# Patient Record
Sex: FEMALE | Race: WHITE | NOT HISPANIC OR LATINO | Employment: FULL TIME | URBAN - METROPOLITAN AREA
[De-identification: names, ages, dates, MRNs, and addresses within clinical notes are randomized per-mention and may not be internally consistent; named-entity substitution may affect disease eponyms.]

---

## 2017-02-02 ENCOUNTER — ALLSCRIPTS OFFICE VISIT (OUTPATIENT)
Dept: OTHER | Facility: OTHER | Age: 37
End: 2017-02-02

## 2017-02-02 ENCOUNTER — GENERIC CONVERSION - ENCOUNTER (OUTPATIENT)
Dept: OTHER | Facility: OTHER | Age: 37
End: 2017-02-02

## 2017-02-02 DIAGNOSIS — O26.849 UTERINE SIZE-DATE DISCREPANCY: ICD-10-CM

## 2017-02-02 DIAGNOSIS — Z34.90 ENCOUNTER FOR SUPERVISION OF NORMAL PREGNANCY: ICD-10-CM

## 2017-02-21 ENCOUNTER — GENERIC CONVERSION - ENCOUNTER (OUTPATIENT)
Dept: OTHER | Facility: OTHER | Age: 37
End: 2017-02-21

## 2017-03-02 ENCOUNTER — APPOINTMENT (OUTPATIENT)
Dept: PERINATAL CARE | Facility: CLINIC | Age: 37
End: 2017-03-02
Payer: COMMERCIAL

## 2017-03-02 ENCOUNTER — GENERIC CONVERSION - ENCOUNTER (OUTPATIENT)
Dept: OTHER | Facility: OTHER | Age: 37
End: 2017-03-02

## 2017-03-02 PROCEDURE — 97802 MEDICAL NUTRITION INDIV IN: CPT | Performed by: OBSTETRICS & GYNECOLOGY

## 2017-03-23 ENCOUNTER — GENERIC CONVERSION - ENCOUNTER (OUTPATIENT)
Dept: OTHER | Facility: OTHER | Age: 37
End: 2017-03-23

## 2017-04-04 ENCOUNTER — GENERIC CONVERSION - ENCOUNTER (OUTPATIENT)
Dept: OTHER | Facility: OTHER | Age: 37
End: 2017-04-04

## 2017-04-04 ENCOUNTER — ALLSCRIPTS OFFICE VISIT (OUTPATIENT)
Dept: PERINATAL CARE | Facility: CLINIC | Age: 37
End: 2017-04-04
Payer: COMMERCIAL

## 2017-04-04 ENCOUNTER — APPOINTMENT (OUTPATIENT)
Dept: PERINATAL CARE | Facility: CLINIC | Age: 37
End: 2017-04-04
Payer: COMMERCIAL

## 2017-04-04 PROCEDURE — 76817 TRANSVAGINAL US OBSTETRIC: CPT | Performed by: OBSTETRICS & GYNECOLOGY

## 2017-04-04 PROCEDURE — 97802 MEDICAL NUTRITION INDIV IN: CPT | Performed by: OBSTETRICS & GYNECOLOGY

## 2017-04-04 PROCEDURE — 76811 OB US DETAILED SNGL FETUS: CPT | Performed by: OBSTETRICS & GYNECOLOGY

## 2017-04-06 ENCOUNTER — APPOINTMENT (OUTPATIENT)
Dept: PERINATAL CARE | Facility: CLINIC | Age: 37
End: 2017-04-06
Payer: COMMERCIAL

## 2017-04-06 ENCOUNTER — GENERIC CONVERSION - ENCOUNTER (OUTPATIENT)
Dept: OTHER | Facility: OTHER | Age: 37
End: 2017-04-06

## 2017-04-06 PROCEDURE — G0108 DIAB MANAGE TRN  PER INDIV: HCPCS | Performed by: OBSTETRICS & GYNECOLOGY

## 2017-04-19 ENCOUNTER — GENERIC CONVERSION - ENCOUNTER (OUTPATIENT)
Dept: OTHER | Facility: OTHER | Age: 37
End: 2017-04-19

## 2017-05-16 ENCOUNTER — GENERIC CONVERSION - ENCOUNTER (OUTPATIENT)
Dept: OTHER | Facility: OTHER | Age: 37
End: 2017-05-16

## 2017-05-16 DIAGNOSIS — Z98.84 BARIATRIC SURGERY STATUS: ICD-10-CM

## 2017-05-16 DIAGNOSIS — Z34.90 ENCOUNTER FOR SUPERVISION OF NORMAL PREGNANCY: ICD-10-CM

## 2017-05-16 DIAGNOSIS — O34.219 MATERNAL CARE FOR SCAR FROM PREVIOUS CESAREAN DELIVERY: ICD-10-CM

## 2017-05-16 DIAGNOSIS — O26.899 OTHER SPECIFIED PREGNANCY RELATED CONDITIONS, UNSPECIFIED TRIMESTER: ICD-10-CM

## 2017-05-25 ENCOUNTER — APPOINTMENT (OUTPATIENT)
Dept: PHYSICAL THERAPY | Facility: REHABILITATION | Age: 37
End: 2017-05-25
Payer: COMMERCIAL

## 2017-05-25 PROCEDURE — 97110 THERAPEUTIC EXERCISES: CPT

## 2017-05-25 PROCEDURE — 97161 PT EVAL LOW COMPLEX 20 MIN: CPT

## 2017-05-26 ENCOUNTER — GENERIC CONVERSION - ENCOUNTER (OUTPATIENT)
Dept: OTHER | Facility: OTHER | Age: 37
End: 2017-05-26

## 2017-05-30 ENCOUNTER — APPOINTMENT (OUTPATIENT)
Dept: PERINATAL CARE | Facility: CLINIC | Age: 37
End: 2017-05-30
Payer: COMMERCIAL

## 2017-05-30 ENCOUNTER — GENERIC CONVERSION - ENCOUNTER (OUTPATIENT)
Dept: OTHER | Facility: OTHER | Age: 37
End: 2017-05-30

## 2017-05-30 ENCOUNTER — ALLSCRIPTS OFFICE VISIT (OUTPATIENT)
Dept: PERINATAL CARE | Facility: CLINIC | Age: 37
End: 2017-05-30
Payer: COMMERCIAL

## 2017-05-30 PROCEDURE — G0108 DIAB MANAGE TRN  PER INDIV: HCPCS | Performed by: OBSTETRICS & GYNECOLOGY

## 2017-05-30 PROCEDURE — 76816 OB US FOLLOW-UP PER FETUS: CPT | Performed by: OBSTETRICS & GYNECOLOGY

## 2017-05-31 ENCOUNTER — APPOINTMENT (OUTPATIENT)
Dept: PHYSICAL THERAPY | Facility: REHABILITATION | Age: 37
End: 2017-05-31
Payer: COMMERCIAL

## 2017-05-31 ENCOUNTER — ALLSCRIPTS OFFICE VISIT (OUTPATIENT)
Dept: OTHER | Facility: OTHER | Age: 37
End: 2017-05-31

## 2017-05-31 PROCEDURE — 97140 MANUAL THERAPY 1/> REGIONS: CPT

## 2017-05-31 PROCEDURE — 97110 THERAPEUTIC EXERCISES: CPT

## 2017-06-05 ENCOUNTER — APPOINTMENT (OUTPATIENT)
Dept: PHYSICAL THERAPY | Facility: REHABILITATION | Age: 37
End: 2017-06-05
Payer: COMMERCIAL

## 2017-06-05 PROCEDURE — 97140 MANUAL THERAPY 1/> REGIONS: CPT

## 2017-06-05 PROCEDURE — 97110 THERAPEUTIC EXERCISES: CPT

## 2017-06-14 ENCOUNTER — GENERIC CONVERSION - ENCOUNTER (OUTPATIENT)
Dept: OTHER | Facility: OTHER | Age: 37
End: 2017-06-14

## 2017-06-15 ENCOUNTER — APPOINTMENT (OUTPATIENT)
Dept: PHYSICAL THERAPY | Facility: REHABILITATION | Age: 37
End: 2017-06-15
Payer: COMMERCIAL

## 2017-06-15 PROCEDURE — 97140 MANUAL THERAPY 1/> REGIONS: CPT

## 2017-06-15 PROCEDURE — 97110 THERAPEUTIC EXERCISES: CPT

## 2017-06-20 ENCOUNTER — APPOINTMENT (OUTPATIENT)
Dept: PHYSICAL THERAPY | Facility: REHABILITATION | Age: 37
End: 2017-06-20
Payer: COMMERCIAL

## 2017-06-20 PROCEDURE — 97140 MANUAL THERAPY 1/> REGIONS: CPT

## 2017-06-20 PROCEDURE — 97110 THERAPEUTIC EXERCISES: CPT

## 2017-06-27 ENCOUNTER — APPOINTMENT (OUTPATIENT)
Dept: PHYSICAL THERAPY | Facility: REHABILITATION | Age: 37
End: 2017-06-27
Payer: COMMERCIAL

## 2017-06-27 ENCOUNTER — GENERIC CONVERSION - ENCOUNTER (OUTPATIENT)
Dept: OTHER | Facility: OTHER | Age: 37
End: 2017-06-27

## 2017-06-27 PROCEDURE — 97140 MANUAL THERAPY 1/> REGIONS: CPT

## 2017-06-27 PROCEDURE — 97110 THERAPEUTIC EXERCISES: CPT

## 2017-07-11 ENCOUNTER — APPOINTMENT (OUTPATIENT)
Dept: PHYSICAL THERAPY | Facility: REHABILITATION | Age: 37
End: 2017-07-11
Payer: COMMERCIAL

## 2017-07-11 PROCEDURE — 97140 MANUAL THERAPY 1/> REGIONS: CPT

## 2017-07-11 PROCEDURE — 97110 THERAPEUTIC EXERCISES: CPT

## 2017-07-25 ENCOUNTER — APPOINTMENT (OUTPATIENT)
Dept: PHYSICAL THERAPY | Facility: REHABILITATION | Age: 37
End: 2017-07-25
Payer: COMMERCIAL

## 2017-07-27 ENCOUNTER — APPOINTMENT (OUTPATIENT)
Dept: PHYSICAL THERAPY | Facility: REHABILITATION | Age: 37
End: 2017-07-27
Payer: COMMERCIAL

## 2017-07-27 PROCEDURE — 97110 THERAPEUTIC EXERCISES: CPT

## 2017-07-27 PROCEDURE — 97140 MANUAL THERAPY 1/> REGIONS: CPT

## 2018-01-12 VITALS
WEIGHT: 239.2 LBS | HEIGHT: 64 IN | SYSTOLIC BLOOD PRESSURE: 104 MMHG | DIASTOLIC BLOOD PRESSURE: 55 MMHG | BODY MASS INDEX: 40.84 KG/M2

## 2018-01-12 VITALS
HEIGHT: 64 IN | BODY MASS INDEX: 40.87 KG/M2 | WEIGHT: 239.4 LBS | DIASTOLIC BLOOD PRESSURE: 71 MMHG | SYSTOLIC BLOOD PRESSURE: 122 MMHG

## 2018-01-13 VITALS
DIASTOLIC BLOOD PRESSURE: 71 MMHG | SYSTOLIC BLOOD PRESSURE: 134 MMHG | WEIGHT: 248.04 LBS | HEIGHT: 65 IN | BODY MASS INDEX: 41.33 KG/M2

## 2018-01-13 NOTE — PROGRESS NOTES
MAY 30 2017         RE: Jaymie Harris                                 To: [id number] [facility] [address]   [facility]   MR#: [id number]                                  [facility]   : [address]   ENC: [id number]:VGVILMA Perea Lei, [facility]   (Exam #: [id number])                           Fax: [phone]      The LMP of this 39year old,  G4, P2-0-1-2 patient was 2016, giving   her an BERTHA of AUG 19 2017 and a current gestational age of 35 weeks 3 days   by dates  A sonographic examination was performed on MAY 30 2017 using   real time equipment  The ultrasound examination was performed using   abdominal technique  The patient has a BMI of 41 4  Her blood pressure   today was 134/71  Earliest ultrasound found in her record: 1/10/17 8w3d  17 BERTHA      Cardiac motion was observed at 148 bpm       INDICATIONS      advanced maternal age   morbid obesity   Previous C/S x 2   maternal gastric bypass   fetal growth   missed anatomy follow up   diabetes, gestational, A1      Exam Types      Level I      RESULTS      Fetus # 1 of 1   Breech presentation   Fetal growth appeared normal   Placenta Location = Anterior   No placenta previa   Placenta Grade = II      MEASUREMENTS (* Included In Average GA)      AC              25 3 cm        29 weeks 4 days* (64%)   BPD              7 1 cm        28 weeks 4 days* (45%)   HC              27 0 cm        29 weeks 1 day * (51%)   Femur            5 3 cm        28 weeks 2 days* (37%)      Humerus          4 9 cm        28 weeks 6 days  (52%)      Cerebellum       3 4 cm        30 weeks 0 days      HC/AC           1 07   FL/AC           0 21   FL/BPD          0 75   EFW (Ac/Fl/Hc)  1330 grams - 2 lbs 15 oz                 (57%)      THE AVERAGE GESTATIONAL AGE is 28 weeks 6 days +/- 18 days        AMNIOTIC FLUID      Q1: 5 6      Q2: 6 3      Q3: 3 6      Q4: 4 9   BALTA Total = 20 4 cm   Amniotic Fluid: Normal      ANATOMY DETAILS      Visualized Appearing Sonographically Normal:   HEAD: (Calvarium, BPD Level, Cavum, Lateral Ventricles, Cerebellum);      FACE/NECK: (Nose/Lips, Palate, Face); HEART: (Four Chamber View,   Proximal Left Outflow, Proximal Right Outflow, Ductal Arch, Aortic Arch,   Interventricular Septum, IVC, SVC, Cardiac Axis, Cardiac Position);      STOMACH, RIGHT KIDNEY, LEFT KIDNEY, BLADDER, PLACENTA      ANATOMY COMMENTS         The prior fetal anatomic survey was limited the area of the N/L, Palate,   AA, Hands  These Views were seen today as sonographically normal within   the inherent limitations of fetal ultrasound  Therefore the anatomic survey is now complete  IMPRESSION      Smith IUP   28 weeks and 6 days by this ultrasound  (BERTHA=AUG 16 2017)   Breech presentation   Fetal growth appeared normal   Regular fetal heart rate of 148 bpm   Anterior placenta   No placenta previa      GENERAL COMMENT      On exam today the patient appears well, in no acute distress, and denies   any complaints  Her abdomen is non-tender  The fetal anatomic survey is now complete  There is no sonographic   evidence of fetal abnormalities at this time  The remainder of the survey   was completed previously  There has been appropriate interval fetal   growth  Good fetal movement and tone are seen  The amniotic fluid volume   appears normal   The placenta is anterior and it appears sonographically   normal   The patient was informed of today's findings and all of her   questions were answered  The limitations of ultrasound were reviewed with   the patient, which she accepts  Precautions and fetal kick counts were   reviewed  We discussed appropriate follow-up in detail and I recommend patient   return in 4 weeks for a fetal growth evaluation for the indication of A1   gestational diabetes with morbid obesity        Please note, in addition to the time spent discussing the results of the   ultrasound, approximately 10 minutes were spent in consultation with the   patient and coordination of care, with greater than 50% of the time spent   in direct face-to-face counseling  Thank you very much for allowing us to participate in the care of this   very nice patient  Should you have any questions, please do not hesitate   to contact our office  GERALDINE Waldrop M D     Electronically signed 05/30/17 12:45           Electronically signed by:Ellie Narayan DO  May 30 5481  1:13PM EST

## 2018-01-15 NOTE — PROGRESS NOTES
2017         RE: Candelaria Pardo                                 To: [id number] [place] [address] [address]   [facility]   MR#: [id number]                                  [facility]   : Margarita [facility]   ENC: [id number]:MATTHEW REESE, [address] [facility]   (Exam #: [id number])                           Fax: [phone]      The LMP of this 39year old,  G4, P2-0-1-2 patient was 2016, giving   her an BERTHA of AUG 19 2017 and a current gestational age of 25 weeks 3 days   by dates  A sonographic examination was performed on 2017 using real   time equipment  The ultrasound examination was performed using abdominal &   vaginal techniques  The patient has a BMI of 41 0  Her blood pressure   today was 122/71  Earliest ultrasound found in her record: 1/10/17 8w3d  17 BERTHA      [facility] is on baby aspirin and prenatal vitamins and denies any allergies or   any use of cigarettes, alcohol or illicit drugs  Her past medical history   is significant for obesity, history of hypertension in the past, history   of depression, advanced maternal age, and history of large babies  She   reports a surgical history that includes 2 prior  sections,   surgery for gastric sleeve and pilonidal cyst  Her OB history includes 2   prior large babies that weighed 9 lbs  6 oz  and 10 lbs  9 oz  that were   delivered by  section and one first trimester miscarriage at 9   weeks  She reports a family history which is significant for diabetes in   her brother  She denies any first generation family history of   hypertension thrombosis or congenital anomalies  She has declined any   genetic screening and has not completed any diabetes testing  She is here   today for anatomy scan  She is planning on a repeat         Cardiac motion was observed at 153 bpm       INDICATIONS      fetal anatomical survey   advanced maternal age morbid obesity   Previous C/S x 2   maternal gastric bypass      Exam Types      Transvaginal   LEVEL II      RESULTS      Fetus # 1 of 1   Fetal growth appeared normal   Placenta Location = Anterior   Placenta Grade = I      MEASUREMENTS (* Included In Average GA)      AC              15 7 cm        20 weeks 4 days* (54%)   BPD              4 6 cm        20 weeks 0 days* (41%)   HC              18 3 cm        20 weeks 4 days* (52%)   Femur            3 3 cm        20 weeks 4 days* (47%)      Nuchal Fold      3 9 mm   NBL              7 2 mm      Humerus          3 2 cm        20 weeks 5 days  (56%)      Cerebellum       2 0 cm        20 weeks 1 day   Biorbit          3 3 cm        20 weeks 6 days   CisternaMagna    5 0 mm      HC/AC           1 16   FL/AC           0 21   FL/BPD          0 72   EFW (Ac/Fl/Hc)   368 grams - 0 lbs 13 oz      THE AVERAGE GESTATIONAL AGE is 20 weeks 3 days +/- 10 days  AMNIOTIC FLUID      Q1: 4 8      Q2:          Q3:          Q4:   BALTA Total = 4 8 cm      CERVICAL EVALUATION      The cervix appeared normal (Ultrasound Examination)  SUPINE      Cervical Length: 3 50 cm      OTHER TEST RESULTS           Funneling?: No             Dynamic Changes?: No        Resp  To TFP?: No      ANATOMY      Head                                    Normal   Face/Neck                               See Details   Th  Cav  Normal   Heart                                   See Details   Abd  Cav  Normal   Stomach                                 Normal   Right Kidney                            Normal   Left Kidney                             Normal   Bladder                                 Normal   Abd   Wall                               Normal   Spine                                   Normal   Extrems                                 See Details   Genitalia                               Normal   Placenta Normal   Umbl  Cord                              Normal   Uterus                                  Normal   PCI                                     Normal      ANATOMY DETAILS      Visualized Appearing Sonographically Normal:   HEAD: (Calvarium, BPD Level, Cavum, Lateral Ventricles, Choroid Plexus,   Cerebellum, Cisterna Magna);    FACE/NECK: (Neck, Nuchal Fold, Profile,   Orbits, Face);    TH  CAV  : (Lungs, Diaphragm); HEART: (Four Chamber   View, Proximal Left Outflow, Proximal Right Outflow, 3VV, 3 Vessel   Trachea, Short Axis of Greater Vessels, Ductal Arch, Interventricular   Septum, Interatrial Septum, IVC, SVC, Cardiac Axis, Cardiac Position);      ABD  CAV : (Liver);    STOMACH, RIGHT KIDNEY, LEFT KIDNEY, BLADDER, ABD  WALL, SPINE: (Cervical Spine, Thoracic Spine, Lumbar Spine, Sacrum);      EXTREMS: (Lt Humerus, Rt Humerus, Lt Forearm, Rt Forearm, Lt Femur, Rt   Femur, Lt Low Leg, Rt Low Leg, Lt Foot, Rt Foot);    GENITALIA, PLACENTA,   UMBL  CORD, UTERUS, PCI      Not Visualized:   FACE/NECK: (Nose/Lips, Palate); HEART: (Aortic Arch);    EXTREMS: (Lt   Hand, Rt Hand)      ANATOMY COMMENTS      Her survey of the fetal anatomy is not complete  No fetal structural abnormality or ultrasound marker for aneuploidy is   identified on the Level II ultrasound study today  The missed or limited   views above are secondary to her skin thickness, fetal position, late   gestational age  Fetal growth and amniotic fluid volume appear normal     Active movement of the fetal body & extremities was seen  There is no   suspicion of a subchorionic bleed  The placental cord insertion was   normal       ADNEXA      The left ovary appeared normal and measured 2 4 x 2 4 x 1 4 cm with a   volume of 4 2 cc  The right ovary appeared normal and measured 2 4 x 2 1 x   1 3 cm with a volume of 3 4 cc        IMPRESSION      Smith IUP   20 weeks and 3 days by this ultrasound  (BERTHA=AUG 19 2017)   Fetal growth appeared normal   Regular fetal heart rate of 153 bpm   Anterior placenta      GENERAL COMMENT      OFFICE CONSULT      As per your request, a consultation was performed on your patient for the   following indication:      1  Obesity s/p gastric sleeve - BMI 40 77   She has normal Folate, vit B12   and iron levels  2  AMA-declined genetic screening   3  PIH in previous pregnancy - taking 81mg Aspirin daily   4  Prior  x 2   5  Family hx of diabetes in a brother  6  Vitamin D , 25 OH def  at 32      The patient was informed of the findings and counseled about the   limitations of the exam in detecting all forms of fetal congenital   abnormalities  She denies any vaginal bleeding or uterine cramping/contractions  She does   feel fetal movement  Exam shows she is comfortable and her abdomen is non tender  Follow up recommended:      1  Return in 8 and 14 weeks for growth scan  and missed anatomy   2  Diabetic screening - She was referred to diabetes education to get a   glucometer to follow her sugars for 1 week now and if normal she will   repeat this at 28 weeks  She reports she is unable to drink a Glucola   screen fast secondary to the concentrated sugar that would cause dumping   syndrome   3  Recommend vitamin D supplementation with 2000 international units daily  4  Recommend weekly nst/ayana from 36 weeks on as patients with a BMI   greater than 40 have an increased risk for stillbirth at term  5  Maternal obesity is associated with an increased risk for adverse   pregnancy outcomes, including gestational diabetes, fetal growth   abnormalities including macrosomia, fetal structural abnormalities,   preeclampsia, venous thromboembolism, stillbirth, and increased likelihood   for  section  Limited weight gain to 10-15 lbs may lessen these   risks  The majority of time (greater then 50%) was spent on counseling and   coordination of care of this patient and /or family member  Approximate   face to face time was 15 minutes  GERALDINE Waldrop M D  Maternal-Fetal Medicine   Electronically signed 04/04/17 18:39           Electronically signed Rosibel ISAACS    Apr 16 2017 10:04PM EST Acknowledgement

## 2018-01-22 VITALS
WEIGHT: 247 LBS | HEIGHT: 65 IN | HEART RATE: 95 BPM | SYSTOLIC BLOOD PRESSURE: 130 MMHG | BODY MASS INDEX: 41.15 KG/M2 | DIASTOLIC BLOOD PRESSURE: 70 MMHG

## 2018-01-22 VITALS
SYSTOLIC BLOOD PRESSURE: 108 MMHG | RESPIRATION RATE: 16 BRPM | HEIGHT: 64 IN | HEART RATE: 80 BPM | BODY MASS INDEX: 41.66 KG/M2 | WEIGHT: 244 LBS | DIASTOLIC BLOOD PRESSURE: 70 MMHG

## 2018-01-22 VITALS
RESPIRATION RATE: 16 BRPM | BODY MASS INDEX: 39.65 KG/M2 | HEART RATE: 88 BPM | HEIGHT: 64 IN | SYSTOLIC BLOOD PRESSURE: 108 MMHG | DIASTOLIC BLOOD PRESSURE: 70 MMHG | WEIGHT: 232.25 LBS

## 2018-01-22 VITALS
BODY MASS INDEX: 39.61 KG/M2 | HEART RATE: 64 BPM | RESPIRATION RATE: 16 BRPM | HEIGHT: 64 IN | SYSTOLIC BLOOD PRESSURE: 110 MMHG | WEIGHT: 232 LBS | DIASTOLIC BLOOD PRESSURE: 60 MMHG

## 2018-01-22 VITALS
WEIGHT: 238 LBS | RESPIRATION RATE: 16 BRPM | BODY MASS INDEX: 40.63 KG/M2 | HEIGHT: 64 IN | DIASTOLIC BLOOD PRESSURE: 56 MMHG | SYSTOLIC BLOOD PRESSURE: 108 MMHG | HEART RATE: 60 BPM

## 2018-01-22 VITALS
DIASTOLIC BLOOD PRESSURE: 68 MMHG | BODY MASS INDEX: 41.51 KG/M2 | HEART RATE: 94 BPM | SYSTOLIC BLOOD PRESSURE: 118 MMHG | WEIGHT: 249.13 LBS | HEIGHT: 65 IN

## 2018-01-22 VITALS — WEIGHT: 230 LBS | HEIGHT: 64 IN | BODY MASS INDEX: 39.27 KG/M2

## 2018-03-07 NOTE — PROGRESS NOTES
Education  Education Items with no Session   Adacel 5-2-15 5 LF-MCG/0 5 Intramuscular Suspension;  Provided: [id number] 02:57PM;  Counselor: Crissy Talley; Baby & Me Education 3rd Trimester: Third Trimester Education provided:   benefits of breastfeeding, importance of exclusive breastfeeding, early initiation of breastfeeding, exclusive breastfeeding for the first 6 months and non-pharmacologic pain relief methods for labor  rooming-in on 24 hour basis      Mother has not registered for prenatal class  Thought has been given to selecting a pediatrician  The mother has discussed personal preferences with her provider     Prenatal education provided by: Dayo Rolon   Electronically signed by : NIC Francis ; Jun 1 2017  5:20PM EST                       (Author)

## 2018-03-07 NOTE — PROGRESS NOTES
Education  [employer] Education 1st Trimester:   First Trimester Education provided:   benefits of breastfeeding, importance of exclusive breastfeeding, early initiation of breastfeeding, exclusive breastfeeding for the first 6 months and Other education given: [id number]    The patient is planning on breastfeeding  Contraindication to breastfeeding identified: NONE  Individualized education given: BLUE OB INTAKE FOLDER GIVEN     Prenatal education provided by: Tremayne Scott      Signatures   Electronically signed by : Neema Anand, ; Feb 2 2017  9:46AM EST                       (Author)

## 2019-05-24 ENCOUNTER — INPATIENT (INPATIENT)
Facility: HOSPITAL | Age: 39
LOS: 11 days | Discharge: HOME | End: 2019-06-05
Attending: PSYCHIATRY & NEUROLOGY | Admitting: PSYCHIATRY & NEUROLOGY
Payer: COMMERCIAL

## 2019-05-24 VITALS
RESPIRATION RATE: 18 BRPM | DIASTOLIC BLOOD PRESSURE: 96 MMHG | HEART RATE: 87 BPM | TEMPERATURE: 98 F | SYSTOLIC BLOOD PRESSURE: 150 MMHG | OXYGEN SATURATION: 100 %

## 2019-05-24 DIAGNOSIS — F33.9 MAJOR DEPRESSIVE DISORDER, RECURRENT, UNSPECIFIED: ICD-10-CM

## 2019-05-24 LAB
ALBUMIN SERPL ELPH-MCNC: 4.4 G/DL — SIGNIFICANT CHANGE UP (ref 3.3–5)
ALP SERPL-CCNC: 71 U/L — SIGNIFICANT CHANGE UP (ref 40–120)
ALT FLD-CCNC: 14 U/L — SIGNIFICANT CHANGE UP (ref 4–33)
AMPHET UR-MCNC: NEGATIVE — SIGNIFICANT CHANGE UP
ANION GAP SERPL CALC-SCNC: 12 MMO/L — SIGNIFICANT CHANGE UP (ref 7–14)
APAP SERPL-MCNC: < 15 UG/ML — LOW (ref 15–25)
APPEARANCE UR: SIGNIFICANT CHANGE UP
AST SERPL-CCNC: 21 U/L — SIGNIFICANT CHANGE UP (ref 4–32)
BACTERIA # UR AUTO: HIGH
BARBITURATES UR SCN-MCNC: NEGATIVE — SIGNIFICANT CHANGE UP
BASOPHILS # BLD AUTO: 0.05 K/UL — SIGNIFICANT CHANGE UP (ref 0–0.2)
BASOPHILS NFR BLD AUTO: 0.6 % — SIGNIFICANT CHANGE UP (ref 0–2)
BENZODIAZ UR-MCNC: NEGATIVE — SIGNIFICANT CHANGE UP
BILIRUB SERPL-MCNC: 0.3 MG/DL — SIGNIFICANT CHANGE UP (ref 0.2–1.2)
BILIRUB UR-MCNC: NEGATIVE — SIGNIFICANT CHANGE UP
BLOOD UR QL VISUAL: SIGNIFICANT CHANGE UP
BUN SERPL-MCNC: 13 MG/DL — SIGNIFICANT CHANGE UP (ref 7–23)
CALCIUM SERPL-MCNC: 9.7 MG/DL — SIGNIFICANT CHANGE UP (ref 8.4–10.5)
CANNABINOIDS UR-MCNC: NEGATIVE — SIGNIFICANT CHANGE UP
CHLORIDE SERPL-SCNC: 102 MMOL/L — SIGNIFICANT CHANGE UP (ref 98–107)
CO2 SERPL-SCNC: 27 MMOL/L — SIGNIFICANT CHANGE UP (ref 22–31)
COCAINE METAB.OTHER UR-MCNC: NEGATIVE — SIGNIFICANT CHANGE UP
COLOR SPEC: YELLOW — SIGNIFICANT CHANGE UP
CREAT SERPL-MCNC: 0.92 MG/DL — SIGNIFICANT CHANGE UP (ref 0.5–1.3)
EOSINOPHIL # BLD AUTO: 0.06 K/UL — SIGNIFICANT CHANGE UP (ref 0–0.5)
EOSINOPHIL NFR BLD AUTO: 0.7 % — SIGNIFICANT CHANGE UP (ref 0–6)
ETHANOL BLD-MCNC: < 10 MG/DL — SIGNIFICANT CHANGE UP
GLUCOSE SERPL-MCNC: 101 MG/DL — HIGH (ref 70–99)
GLUCOSE UR-MCNC: NEGATIVE — SIGNIFICANT CHANGE UP
HCT VFR BLD CALC: 38 % — SIGNIFICANT CHANGE UP (ref 34.5–45)
HGB BLD-MCNC: 11.6 G/DL — SIGNIFICANT CHANGE UP (ref 11.5–15.5)
HYALINE CASTS # UR AUTO: SIGNIFICANT CHANGE UP
IMM GRANULOCYTES NFR BLD AUTO: 0.3 % — SIGNIFICANT CHANGE UP (ref 0–1.5)
KETONES UR-MCNC: NEGATIVE — SIGNIFICANT CHANGE UP
LEUKOCYTE ESTERASE UR-ACNC: NEGATIVE — SIGNIFICANT CHANGE UP
LYMPHOCYTES # BLD AUTO: 2.23 K/UL — SIGNIFICANT CHANGE UP (ref 1–3.3)
LYMPHOCYTES # BLD AUTO: 25 % — SIGNIFICANT CHANGE UP (ref 13–44)
MCHC RBC-ENTMCNC: 24.4 PG — LOW (ref 27–34)
MCHC RBC-ENTMCNC: 30.5 % — LOW (ref 32–36)
MCV RBC AUTO: 79.8 FL — LOW (ref 80–100)
METHADONE UR-MCNC: NEGATIVE — SIGNIFICANT CHANGE UP
MONOCYTES # BLD AUTO: 0.87 K/UL — SIGNIFICANT CHANGE UP (ref 0–0.9)
MONOCYTES NFR BLD AUTO: 9.8 % — SIGNIFICANT CHANGE UP (ref 2–14)
NEUTROPHILS # BLD AUTO: 5.68 K/UL — SIGNIFICANT CHANGE UP (ref 1.8–7.4)
NEUTROPHILS NFR BLD AUTO: 63.6 % — SIGNIFICANT CHANGE UP (ref 43–77)
NITRITE UR-MCNC: NEGATIVE — SIGNIFICANT CHANGE UP
NRBC # FLD: 0 K/UL — SIGNIFICANT CHANGE UP (ref 0–0)
OPIATES UR-MCNC: NEGATIVE — SIGNIFICANT CHANGE UP
OXYCODONE UR-MCNC: NEGATIVE — SIGNIFICANT CHANGE UP
PCP UR-MCNC: NEGATIVE — SIGNIFICANT CHANGE UP
PH UR: 6.5 — SIGNIFICANT CHANGE UP (ref 5–8)
PLATELET # BLD AUTO: 294 K/UL — SIGNIFICANT CHANGE UP (ref 150–400)
PMV BLD: 10.7 FL — SIGNIFICANT CHANGE UP (ref 7–13)
POTASSIUM SERPL-MCNC: 4.3 MMOL/L — SIGNIFICANT CHANGE UP (ref 3.5–5.3)
POTASSIUM SERPL-SCNC: 4.3 MMOL/L — SIGNIFICANT CHANGE UP (ref 3.5–5.3)
PROT SERPL-MCNC: 7.8 G/DL — SIGNIFICANT CHANGE UP (ref 6–8.3)
PROT UR-MCNC: 20 — SIGNIFICANT CHANGE UP
RBC # BLD: 4.76 M/UL — SIGNIFICANT CHANGE UP (ref 3.8–5.2)
RBC # FLD: 15.6 % — HIGH (ref 10.3–14.5)
RBC CASTS # UR COMP ASSIST: SIGNIFICANT CHANGE UP (ref 0–?)
SALICYLATES SERPL-MCNC: < 5 MG/DL — LOW (ref 15–30)
SODIUM SERPL-SCNC: 141 MMOL/L — SIGNIFICANT CHANGE UP (ref 135–145)
SP GR SPEC: 1.02 — SIGNIFICANT CHANGE UP (ref 1–1.04)
SQUAMOUS # UR AUTO: SIGNIFICANT CHANGE UP
TSH SERPL-MCNC: 1.38 UIU/ML — SIGNIFICANT CHANGE UP (ref 0.27–4.2)
UROBILINOGEN FLD QL: NORMAL — SIGNIFICANT CHANGE UP
WBC # BLD: 8.92 K/UL — SIGNIFICANT CHANGE UP (ref 3.8–10.5)
WBC # FLD AUTO: 8.92 K/UL — SIGNIFICANT CHANGE UP (ref 3.8–10.5)
WBC UR QL: HIGH (ref 0–?)

## 2019-05-24 PROCEDURE — 99285 EMERGENCY DEPT VISIT HI MDM: CPT | Mod: GC

## 2019-05-24 PROCEDURE — 90792 PSYCH DIAG EVAL W/MED SRVCS: CPT

## 2019-05-24 NOTE — ED PROVIDER NOTE - OBJECTIVE STATEMENT
37 y/o F hx MDD  BIBA w c/o depression and suicidal ideations .Admits to multiple social stressors. Denies falling, punching or kicking any objects.  Denies pain, SOB, fever, chills, chest/ abdominal discomfort. Denies HI/AH/VH. Denies recent use of alcohol or illicit drugs  . No evidence  of physical injuries, broken  skin or deformities.
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
## 2019-05-24 NOTE — ED BEHAVIORAL HEALTH ASSESSMENT NOTE - RISK ASSESSMENT
Acute risk is high given her dysphoria and thoughts of suicide with plan. Recent psychosocial stressors add to her acute risk. Risk will be mitigated by psychiatric admission. Protective factors are denying intent or plan to end her life in the hospital, denying substance, identifying her kids as reasons for living.

## 2019-05-24 NOTE — ED BEHAVIORAL HEALTH ASSESSMENT NOTE - CASE SUMMARY
Discussed and reviewed the case with DR. Hadley. pt is 38 year-old woman with depression, no psychiatric hospitalizations, no history of suicide attempts or SIB, denying substance abuse,  from , divorce proceedings underway, living now with her parents and three children (ages 9, 4 and 1), the one year-old is being breast fed, guns locked up at home, presenting to the ED with depression and suicidal thoughts. Pt will be admitted to inpatient  for further evaluation and stabilization.

## 2019-05-24 NOTE — ED BEHAVIORAL HEALTH ASSESSMENT NOTE - AXIS III
Reports a history of HTN, no currently on any antihypertensives. Of note, the patient is breastfeeding her one year-old child and would like access to a breast pump while on 2W.

## 2019-05-24 NOTE — ED BEHAVIORAL HEALTH ASSESSMENT NOTE - DETAILS
Three children See above Reports brothers with SAD and OCD and depression. No family history of suicide attempts or completions. Guns in home are locked CESAR informed Informed sister of disposition

## 2019-05-24 NOTE — ED BEHAVIORAL HEALTH ASSESSMENT NOTE - DESCRIPTION
Withdrawn, dysphoric    ICU Vital Signs Last 24 Hrs  T(C): 36.7 (24 May 2019 20:33), Max: 36.7 (24 May 2019 20:33)  T(F): 98 (24 May 2019 20:33), Max: 98 (24 May 2019 20:33)  HR: 87 (24 May 2019 20:33) (87 - 87)  BP: 150/96 (24 May 2019 20:33) (150/96 - 150/96)  BP(mean): --  ABP: --  ABP(mean): --  RR: 18 (24 May 2019 20:33) (18 - 18)  SpO2: 100% (24 May 2019 20:33) (100% - 100%) History of HTN, denies she's currently on an antihypertensive. Says she was on labetalol in the past. History of gastric sleeve procedure. . Has three kids.

## 2019-05-24 NOTE — ED ADULT TRIAGE NOTE - CHIEF COMPLAINT QUOTE
Pt. w/ hx. depression and anxiety c/o SI thoughts . Pt. denies any actual plan , just states she is having thoughts.  Pt. states she is on Zoloft , and is compliant w/ medication . Pt. calm and cooperative in triage. Pt. awaiting for  eval .

## 2019-05-24 NOTE — ED PROVIDER NOTE - CLINICAL SUMMARY MEDICAL DECISION MAKING FREE TEXT BOX
Labs, Urine Tox/UA, EKG   Medical evaluation performed. There is no clinical evidence of intoxication or any acute medical problem requiring immediate intervention. Patient is awaiting psychiatric consultation. Final disposition will be determined by psychiatrist. Recommend Inpatient admission 37 y/o F  Labs, Urine Tox/UA, EKG   Medical evaluation performed. There is no clinical evidence of intoxication or any acute medical problem requiring immediate intervention. Patient is awaiting psychiatric consultation. Final disposition will be determined by psychiatrist. Recommend Inpatient admission

## 2019-05-24 NOTE — ED BEHAVIORAL HEALTH ASSESSMENT NOTE - HPI (INCLUDE ILLNESS QUALITY, SEVERITY, DURATION, TIMING, CONTEXT, MODIFYING FACTORS, ASSOCIATED SIGNS AND SYMPTOMS)
38 year-old woman with depression, no psychiatric hospitalizations, no history of suicide attempts or SIB, denying substance abuse,  from , divorce proceedings underway, living now with her parents and three children (ages 9, 4 and 1), the one year-old is being breast fed, guns locked up at home, presenting to the ED with depression and suicidal thoughts. On approach, the patient is seen in  looking dysphoric and withdrawn. She reports she sold her sex-anon sponsor today that she's been having thoughts of ending her life, and the sponsor told her to go to the hospital. She then told her sister, who is a school psychologist, and the sister brought her to the hospital. The patient reports she's been depressed since learning several months ago about her 's infidelity. Soon after learning of his infidelity, she took the kids and left him. 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 She says her PCP in [place] did paperwork to authorize psychiatric disability (denies having a psychiatrist currently) and she's been on a leave of absence from her job. She says her mood here in [place] has worsened. Says she's been having thoughts recently about ending her life. The thoughts have included stabbing her wrists with a knife and stepping off the sidewalk in [place] in front of 38 year-old woman with depression, no psychiatric hospitalizations, no history of suicide attempts or SIB, denying substance abuse,  from , divorce proceedings underway, living now with her parents and three children (ages 9, 4 and 1), the one year-old is being breast fed, guns locked up at home, presenting to the ED with depression and suicidal thoughts. On approach, the patient is seen in  with dysphoric affect and is withdrawn. She reports she told her [employer] sponsor today that she's been having thoughts of ending her life, and the sponsor told her to go to the hospital. She then told her sister, who is a school psychologist, and the sister brought her to the hospital. The patient reports she's been depressed since learning several months ago about her 's infidelity. Soon after learning of his infidelity, she took the kids and left him. 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 She says her PCP in [place] did paperwork to authorize psychiatric disability (denies having a psychiatrist currently) and she's been on a leave of absence from her job. She says her mood here in [place] has worsened. Says she's been having thoughts recently about ending her life. The thoughts have included stabbing her wrists with a knife and stepping off the sidewalk in [place] in front of truck. Also reports low energy, excessive sleep. Endorses fluctuations in appetite. She says she had thoughts that work colleagues were conspiring against her because of a crush she has on a work colleague. She denies AH or thought insertion, withdrawal, or broadcasting.  Denies symptoms of coral in the past. Denies she has a plan in the hospital to end her life.

## 2019-05-24 NOTE — ED BEHAVIORAL HEALTH ASSESSMENT NOTE - SUMMARY
38 year-old woman with depression, no psychiatric hospitalizations, no history of suicide attempts or SIB, denying substance abuse,  from , divorce proceedings underway, living now with her parents and three children (ages 9, 4 and 1), the one year-old is being breast fed, guns locked up at home, presenting to the ED with depression and suicidal thoughts. On exam, the patient is dysphoric and has thoughts of ending her life. She will benefit from admission to the psychiatric hospital and agrees to sign voluntary papers.

## 2019-05-25 DIAGNOSIS — F32.9 MAJOR DEPRESSIVE DISORDER, SINGLE EPISODE, UNSPECIFIED: ICD-10-CM

## 2019-05-25 RX ORDER — ARIPIPRAZOLE 15 MG/1
2 TABLET ORAL DAILY
Refills: 0 | Status: DISCONTINUED | OUTPATIENT
Start: 2019-05-25 | End: 2019-05-29

## 2019-05-25 RX ORDER — SERTRALINE 25 MG/1
200 TABLET, FILM COATED ORAL DAILY
Refills: 0 | Status: DISCONTINUED | OUTPATIENT
Start: 2019-05-25 | End: 2019-06-05

## 2019-05-25 RX ORDER — ARIPIPRAZOLE 15 MG/1
2 TABLET ORAL ONCE
Refills: 0 | Status: COMPLETED | OUTPATIENT
Start: 2019-05-25 | End: 2019-05-25

## 2019-05-25 RX ORDER — HYDROXYZINE HCL 10 MG
50 TABLET ORAL EVERY 8 HOURS
Refills: 0 | Status: DISCONTINUED | OUTPATIENT
Start: 2019-05-25 | End: 2019-06-03

## 2019-05-25 RX ADMIN — Medication 5 MILLIGRAM(S): at 21:49

## 2019-05-25 RX ADMIN — ARIPIPRAZOLE 2 MILLIGRAM(S): 15 TABLET ORAL at 16:12

## 2019-05-25 RX ADMIN — Medication 50 MILLIGRAM(S): at 12:42

## 2019-05-25 RX ADMIN — SERTRALINE 200 MILLIGRAM(S): 25 TABLET, FILM COATED ORAL at 09:02

## 2019-05-25 RX ADMIN — Medication 5 MILLIGRAM(S): at 09:02

## 2019-05-25 RX ADMIN — Medication 50 MILLIGRAM(S): at 21:49

## 2019-05-26 PROCEDURE — 99232 SBSQ HOSP IP/OBS MODERATE 35: CPT

## 2019-05-26 RX ADMIN — Medication 50 MILLIGRAM(S): at 13:45

## 2019-05-26 RX ADMIN — ARIPIPRAZOLE 2 MILLIGRAM(S): 15 TABLET ORAL at 08:52

## 2019-05-26 RX ADMIN — SERTRALINE 200 MILLIGRAM(S): 25 TABLET, FILM COATED ORAL at 08:52

## 2019-05-26 RX ADMIN — Medication 5 MILLIGRAM(S): at 08:52

## 2019-05-26 RX ADMIN — Medication 10 MILLIGRAM(S): at 20:51

## 2019-05-27 LAB
IRON SATN MFR SERPL: 35 UG/DL — SIGNIFICANT CHANGE UP (ref 30–160)
VIT B12 SERPL-MCNC: 487 PG/ML — SIGNIFICANT CHANGE UP (ref 200–900)

## 2019-05-27 PROCEDURE — 99232 SBSQ HOSP IP/OBS MODERATE 35: CPT

## 2019-05-27 RX ADMIN — Medication 10 MILLIGRAM(S): at 08:49

## 2019-05-27 RX ADMIN — ARIPIPRAZOLE 2 MILLIGRAM(S): 15 TABLET ORAL at 08:49

## 2019-05-27 RX ADMIN — SERTRALINE 200 MILLIGRAM(S): 25 TABLET, FILM COATED ORAL at 08:49

## 2019-05-27 RX ADMIN — Medication 10 MILLIGRAM(S): at 15:12

## 2019-05-27 RX ADMIN — Medication 50 MILLIGRAM(S): at 17:10

## 2019-05-28 PROCEDURE — 99232 SBSQ HOSP IP/OBS MODERATE 35: CPT

## 2019-05-28 RX ADMIN — ARIPIPRAZOLE 2 MILLIGRAM(S): 15 TABLET ORAL at 08:38

## 2019-05-28 RX ADMIN — Medication 10 MILLIGRAM(S): at 08:38

## 2019-05-28 RX ADMIN — SERTRALINE 200 MILLIGRAM(S): 25 TABLET, FILM COATED ORAL at 08:38

## 2019-05-28 RX ADMIN — Medication 10 MILLIGRAM(S): at 00:39

## 2019-05-28 RX ADMIN — Medication 10 MILLIGRAM(S): at 20:58

## 2019-05-28 RX ADMIN — Medication 50 MILLIGRAM(S): at 12:57

## 2019-05-29 PROBLEM — F32.9 MAJOR DEPRESSIVE DISORDER, SINGLE EPISODE, UNSPECIFIED: Chronic | Status: ACTIVE | Noted: 2019-05-27

## 2019-05-29 PROCEDURE — 99232 SBSQ HOSP IP/OBS MODERATE 35: CPT

## 2019-05-29 RX ORDER — BUPROPION HYDROCHLORIDE 150 MG/1
150 TABLET, EXTENDED RELEASE ORAL DAILY
Refills: 0 | Status: DISCONTINUED | OUTPATIENT
Start: 2019-05-29 | End: 2019-06-05

## 2019-05-29 RX ADMIN — Medication 15 MILLIGRAM(S): at 23:02

## 2019-05-29 RX ADMIN — SERTRALINE 200 MILLIGRAM(S): 25 TABLET, FILM COATED ORAL at 08:36

## 2019-05-29 RX ADMIN — Medication 50 MILLIGRAM(S): at 23:03

## 2019-05-29 RX ADMIN — ARIPIPRAZOLE 2 MILLIGRAM(S): 15 TABLET ORAL at 08:36

## 2019-05-29 RX ADMIN — Medication 10 MILLIGRAM(S): at 08:36

## 2019-05-30 PROCEDURE — 90853 GROUP PSYCHOTHERAPY: CPT

## 2019-05-30 PROCEDURE — 99232 SBSQ HOSP IP/OBS MODERATE 35: CPT

## 2019-05-30 RX ADMIN — Medication 50 MILLIGRAM(S): at 21:21

## 2019-05-30 RX ADMIN — Medication 15 MILLIGRAM(S): at 09:59

## 2019-05-30 RX ADMIN — SERTRALINE 200 MILLIGRAM(S): 25 TABLET, FILM COATED ORAL at 09:59

## 2019-05-30 RX ADMIN — Medication 15 MILLIGRAM(S): at 21:20

## 2019-05-30 RX ADMIN — BUPROPION HYDROCHLORIDE 150 MILLIGRAM(S): 150 TABLET, EXTENDED RELEASE ORAL at 09:59

## 2019-05-31 LAB
APPEARANCE UR: CLEAR — SIGNIFICANT CHANGE UP
BACTERIA # UR AUTO: NEGATIVE — SIGNIFICANT CHANGE UP
BILIRUB UR-MCNC: NEGATIVE — SIGNIFICANT CHANGE UP
BLOOD UR QL VISUAL: NEGATIVE — SIGNIFICANT CHANGE UP
COLOR SPEC: YELLOW — SIGNIFICANT CHANGE UP
GLUCOSE UR-MCNC: NEGATIVE — SIGNIFICANT CHANGE UP
HYALINE CASTS # UR AUTO: NEGATIVE — SIGNIFICANT CHANGE UP
KETONES UR-MCNC: NEGATIVE — SIGNIFICANT CHANGE UP
LEUKOCYTE ESTERASE UR-ACNC: NEGATIVE — SIGNIFICANT CHANGE UP
NITRITE UR-MCNC: NEGATIVE — SIGNIFICANT CHANGE UP
PH UR: 6.5 — SIGNIFICANT CHANGE UP (ref 5–8)
PROT UR-MCNC: NEGATIVE — SIGNIFICANT CHANGE UP
RBC CASTS # UR COMP ASSIST: SIGNIFICANT CHANGE UP (ref 0–?)
SP GR SPEC: 1.02 — SIGNIFICANT CHANGE UP (ref 1–1.04)
SQUAMOUS # UR AUTO: SIGNIFICANT CHANGE UP
UROBILINOGEN FLD QL: NORMAL — SIGNIFICANT CHANGE UP
WBC UR QL: SIGNIFICANT CHANGE UP (ref 0–?)

## 2019-05-31 PROCEDURE — 99232 SBSQ HOSP IP/OBS MODERATE 35: CPT

## 2019-05-31 PROCEDURE — 90853 GROUP PSYCHOTHERAPY: CPT

## 2019-05-31 RX ADMIN — SERTRALINE 200 MILLIGRAM(S): 25 TABLET, FILM COATED ORAL at 09:22

## 2019-05-31 RX ADMIN — BUPROPION HYDROCHLORIDE 150 MILLIGRAM(S): 150 TABLET, EXTENDED RELEASE ORAL at 09:22

## 2019-05-31 RX ADMIN — Medication 15 MILLIGRAM(S): at 09:22

## 2019-05-31 RX ADMIN — Medication 50 MILLIGRAM(S): at 22:02

## 2019-05-31 RX ADMIN — Medication 15 MILLIGRAM(S): at 22:02

## 2019-06-01 RX ADMIN — Medication 15 MILLIGRAM(S): at 09:06

## 2019-06-01 RX ADMIN — BUPROPION HYDROCHLORIDE 150 MILLIGRAM(S): 150 TABLET, EXTENDED RELEASE ORAL at 09:06

## 2019-06-01 RX ADMIN — SERTRALINE 200 MILLIGRAM(S): 25 TABLET, FILM COATED ORAL at 09:06

## 2019-06-02 RX ORDER — HYDROXYZINE HCL 10 MG
50 TABLET ORAL ONCE
Refills: 0 | Status: COMPLETED | OUTPATIENT
Start: 2019-06-02 | End: 2019-06-02

## 2019-06-02 RX ORDER — TRAZODONE HCL 50 MG
50 TABLET ORAL ONCE
Refills: 0 | Status: COMPLETED | OUTPATIENT
Start: 2019-06-02 | End: 2019-06-02

## 2019-06-02 RX ADMIN — Medication 15 MILLIGRAM(S): at 21:19

## 2019-06-02 RX ADMIN — Medication 15 MILLIGRAM(S): at 21:51

## 2019-06-02 RX ADMIN — Medication 50 MILLIGRAM(S): at 09:41

## 2019-06-02 RX ADMIN — SERTRALINE 200 MILLIGRAM(S): 25 TABLET, FILM COATED ORAL at 08:50

## 2019-06-02 RX ADMIN — Medication 50 MILLIGRAM(S): at 23:36

## 2019-06-02 RX ADMIN — BUPROPION HYDROCHLORIDE 150 MILLIGRAM(S): 150 TABLET, EXTENDED RELEASE ORAL at 08:50

## 2019-06-02 RX ADMIN — Medication 50 MILLIGRAM(S): at 13:33

## 2019-06-02 RX ADMIN — Medication 50 MILLIGRAM(S): at 21:51

## 2019-06-02 RX ADMIN — Medication 15 MILLIGRAM(S): at 08:50

## 2019-06-03 PROCEDURE — 99232 SBSQ HOSP IP/OBS MODERATE 35: CPT

## 2019-06-03 RX ORDER — HYDROXYZINE HCL 10 MG
50 TABLET ORAL EVERY 6 HOURS
Refills: 0 | Status: DISCONTINUED | OUTPATIENT
Start: 2019-06-03 | End: 2019-06-05

## 2019-06-03 RX ORDER — TRAZODONE HCL 50 MG
50 TABLET ORAL AT BEDTIME
Refills: 0 | Status: DISCONTINUED | OUTPATIENT
Start: 2019-06-03 | End: 2019-06-05

## 2019-06-03 RX ADMIN — Medication 50 MILLIGRAM(S): at 21:04

## 2019-06-03 RX ADMIN — BUPROPION HYDROCHLORIDE 150 MILLIGRAM(S): 150 TABLET, EXTENDED RELEASE ORAL at 09:06

## 2019-06-03 RX ADMIN — SERTRALINE 200 MILLIGRAM(S): 25 TABLET, FILM COATED ORAL at 09:07

## 2019-06-03 RX ADMIN — Medication 15 MILLIGRAM(S): at 21:04

## 2019-06-03 RX ADMIN — Medication 15 MILLIGRAM(S): at 09:06

## 2019-06-04 PROCEDURE — 99232 SBSQ HOSP IP/OBS MODERATE 35: CPT

## 2019-06-04 PROCEDURE — 90853 GROUP PSYCHOTHERAPY: CPT

## 2019-06-04 RX ORDER — HYDROXYZINE HCL 10 MG
1 TABLET ORAL
Qty: 28 | Refills: 0
Start: 2019-06-04 | End: 2019-06-17

## 2019-06-04 RX ORDER — BUPROPION HYDROCHLORIDE 150 MG/1
1 TABLET, EXTENDED RELEASE ORAL
Qty: 14 | Refills: 0
Start: 2019-06-04 | End: 2019-06-17

## 2019-06-04 RX ORDER — TRAZODONE HCL 50 MG
1 TABLET ORAL
Qty: 14 | Refills: 0
Start: 2019-06-04 | End: 2019-06-17

## 2019-06-04 RX ORDER — SERTRALINE 25 MG/1
2 TABLET, FILM COATED ORAL
Qty: 28 | Refills: 0
Start: 2019-06-04 | End: 2019-06-17

## 2019-06-04 RX ADMIN — Medication 15 MILLIGRAM(S): at 21:01

## 2019-06-04 RX ADMIN — SERTRALINE 200 MILLIGRAM(S): 25 TABLET, FILM COATED ORAL at 08:45

## 2019-06-04 RX ADMIN — Medication 50 MILLIGRAM(S): at 21:01

## 2019-06-04 RX ADMIN — BUPROPION HYDROCHLORIDE 150 MILLIGRAM(S): 150 TABLET, EXTENDED RELEASE ORAL at 08:45

## 2019-06-04 RX ADMIN — Medication 15 MILLIGRAM(S): at 08:45

## 2019-06-05 VITALS — RESPIRATION RATE: 19 BRPM | TEMPERATURE: 98 F

## 2019-06-05 PROCEDURE — 99238 HOSP IP/OBS DSCHRG MGMT 30/<: CPT

## 2019-06-05 PROCEDURE — 90853 GROUP PSYCHOTHERAPY: CPT

## 2019-06-05 RX ADMIN — BUPROPION HYDROCHLORIDE 150 MILLIGRAM(S): 150 TABLET, EXTENDED RELEASE ORAL at 08:44

## 2019-06-05 RX ADMIN — Medication 15 MILLIGRAM(S): at 08:44

## 2019-06-05 RX ADMIN — SERTRALINE 200 MILLIGRAM(S): 25 TABLET, FILM COATED ORAL at 08:44

## 2019-06-06 ENCOUNTER — OUTPATIENT (OUTPATIENT)
Dept: OUTPATIENT SERVICES | Facility: HOSPITAL | Age: 39
LOS: 1 days | Discharge: HOME | End: 2019-06-06

## 2019-06-07 DIAGNOSIS — F32.9 MAJOR DEPRESSIVE DISORDER, SINGLE EPISODE, UNSPECIFIED: ICD-10-CM

## 2019-07-23 ENCOUNTER — OUTPATIENT (OUTPATIENT)
Dept: OUTPATIENT SERVICES | Facility: HOSPITAL | Age: 39
LOS: 1 days | Discharge: HOME | End: 2019-07-23

## 2019-07-24 DIAGNOSIS — F32.9 MAJOR DEPRESSIVE DISORDER, SINGLE EPISODE, UNSPECIFIED: ICD-10-CM

## 2023-10-17 NOTE — ED PROVIDER NOTE - CCCP TRG CHIEF CMPLNT
Per Dr. Cornell, patient is to start taking 0.5 mg of Sirolimus daily, and she is to drop her FK to 1.5/1 mg BID.     Attempted to call patient, but patient did not answer. A voicemail was left requesting a call back. Medication list updated.    psychiatric evaluation

## 2024-09-24 NOTE — ED PROVIDER NOTE - PSYCHIATRIC [+], MLM
Medical Necessity Clause: This procedure was medically necessary because the lesions that were treated were: Show Topical Anesthesia Variable?: Yes Consent: The patient's consent was obtained including but not limited to risks of crusting, scabbing, blistering, scarring, darker or lighter pigmentary change, recurrence, incomplete removal and infection. Medical Necessity Information: It is in your best interest to select a reason for this procedure from the list below. All of these items fulfill various CMS LCD requirements except the new and changing color options. Number Of Freeze-Thaw Cycles: 3 freeze-thaw cycles Detail Level: Detailed Duration Of Freeze Thaw-Cycle (Seconds): 1 Render Note In Bullet Format When Appropriate: No Post-Care Instructions: I reviewed with the patient in detail post-care instructions. Patient is to wear sunprotection, and avoid picking at any of the treated lesions. Pt may apply Vaseline to crusted or scabbing areas. Spray Paint Text: The liquid nitrogen was applied to the skin utilizing a spray paint frosting technique. ANXIETY/Depression/SI